# Patient Record
Sex: FEMALE | Race: WHITE | NOT HISPANIC OR LATINO | Employment: FULL TIME | URBAN - METROPOLITAN AREA
[De-identification: names, ages, dates, MRNs, and addresses within clinical notes are randomized per-mention and may not be internally consistent; named-entity substitution may affect disease eponyms.]

---

## 2017-01-19 ENCOUNTER — OFFICE VISIT - HEALTHEAST (OUTPATIENT)
Dept: FAMILY MEDICINE | Facility: CLINIC | Age: 32
End: 2017-01-19

## 2017-01-19 DIAGNOSIS — R07.81 RIB PAIN ON RIGHT SIDE: ICD-10-CM

## 2017-01-19 DIAGNOSIS — J32.9 SINUSITIS: ICD-10-CM

## 2017-01-19 RX ORDER — NORETHINDRONE ACETATE AND ETHINYL ESTRADIOL AND FERROUS FUMARATE 1.5-30(21)
KIT ORAL
Refills: 5 | Status: SHIPPED | COMMUNITY
Start: 2017-01-11

## 2017-01-19 RX ORDER — IBUPROFEN 200 MG
200 TABLET ORAL EVERY 6 HOURS PRN
Status: SHIPPED | COMMUNITY
Start: 2017-01-19

## 2019-01-29 ENCOUNTER — COMMUNICATION - HEALTHEAST (OUTPATIENT)
Dept: HEALTH INFORMATION MANAGEMENT | Facility: CLINIC | Age: 34
End: 2019-01-29

## 2021-05-24 ENCOUNTER — RECORDS - HEALTHEAST (OUTPATIENT)
Dept: ADMINISTRATIVE | Facility: CLINIC | Age: 36
End: 2021-05-24

## 2021-05-27 ENCOUNTER — RECORDS - HEALTHEAST (OUTPATIENT)
Dept: ADMINISTRATIVE | Facility: CLINIC | Age: 36
End: 2021-05-27

## 2021-05-30 VITALS — WEIGHT: 149.5 LBS

## 2021-06-01 ENCOUNTER — RECORDS - HEALTHEAST (OUTPATIENT)
Dept: ADMINISTRATIVE | Facility: CLINIC | Age: 36
End: 2021-06-01

## 2021-06-08 NOTE — PROGRESS NOTES
ASSESSMENT:   1. Sinusitis  amoxicillin-clavulanate (AUGMENTIN) 875-125 mg per tablet   2. Rib pain on right side             Patient is a otherwise healthy 31-year-old female presenting for evaluation of cough for the last 4 weeks.  Patient was likely within normal limits upon presentation.  She tolerated quite well.  She gives a history of infectious symptoms.  No clinical indications that cough represents PE, pleural effusion, PTX, or lung mass, or any other more emergent etiology of symptoms today.  I think her rib pain is most likely due to frequent coughing.  There is point tenderness along the right ribs along the mid axillary line.  This would not be consistent with PE.  I do not think her cough is due to pertussis.  Lungs were completely clear, and she is beyond recommended treatment window anyways, thus I do not think pertussis testing or empiric therapy is indicated today.  I think the most likely cause for her cough is postnasal drip from a sinusitis, which she affirms in her history. I will treat with Augmentin.    At the end of the encounter, I discussed results, diagnosis, medications. Discussed red flags for immediate return to clinic/ER, as well as indications for follow up if no improvement. Patient understood and agreed to plan. Patient was stable for discharge.    *I was masked during all patient interactions. Patient was also masked from time of registration.        PLAN:  1. Cough and nasal congestion  The lungs themselves sounded clear. The most likely cause for your cough is persistent post-nasal drip from a sinusitis. You look and sound quite congested, and since you have been ill for so long, it is worth treatment with an antibiotic for the sinuses today.    Your symptoms are most likely due to a sinus infection. I will send a prescription for Augmentin to your pharmacy. Please take as directed for 10 days. Take with food. Use a probiotic.    You could also try a neti-pot to help with  "congestion. You can try Mucinex to help loosen the mucous. You can also use ibuprofen to help decrease inflammation.    If you develop fevers, trouble breathing, or any new, concerning symptoms, return immediately for re-evaluation. Otherwise, follow up with primary care if not getting better in 10 days.      2. Rib pain  This is most likelya strain of the intercostal muscles due to coughing.   The best treatment for this is NSAIDs. May try Naproxen (Aleve) every 12 hours for pain. May also try heat therapy, over the counter lidocaine patches, and gentle stretches.  Follow up with primary care doctor if no improvement in 1-2 weeks. If getting worse, coughing up blood, feeling short of breathing, or having chest pain, come back immediately.            SUBJECTIVE:   Arianna Garcia is a 31 y.o. female who presents today for evaluation of cough for the last 4 weeks. She has had persistent congestion and cough. The cough has become more \"painful.\" She started having pain in the right side of her ribs with coughing, breathing, and moving. She has not noticed any bruising. It is tender when she touches it.   The cough is mostly dry. In the morning it is productive of dark yellow mucous. She is also having dark yellow nasal drainage.   No fevers, shortness of breath, wheezing, chest pain, leg swelling or leg pain. No body aches. She denies undue fatigue. No appetite changes. No history of blood clots. She takes daily OCPs. No chance of pregnancy per patient. Other people at work are ill with colds as well. Her  had influenza this past weekend.  She did not get an influenza vaccine this year.    Past Medical History:  Otherwise healthy; no chronic medical conditions    Surgical History:  Reviewed; Non-contributory    Family History:  Reviewed; Non-contributory      Social History:    History   Smoking Status     Never Smoker   Smokeless Tobacco     Not on file     Smoking: none  Alcohol use: rarely  Other drug " use: rarely  Occupation:         Current Medications:  No current outpatient prescriptions on file prior to visit.     No current facility-administered medications on file prior to visit.        Allergies:   No Known Allergies    I personally reviewed patient's past medical, surgical, social, family history and allergies.    ROS:  Review of Systems  See HPI      OBJECTIVE:   Visit Vitals     /68     Pulse 77     Temp 98.6  F (37  C) (Oral)     Resp 14     Wt 149 lb 8 oz (67.8 kg)     SpO2 100%         General Appearance:  Alert, well-appearing female in NAD. Afebrile.   Integument: Warm, dry.  HEENT:  Head: Atraumatic, normocephalic. Face nontraumatic.  Eyes: Conjunctiva clear, Lids normal.  Ears:  TMs dull bilaterally. No canal erythema or edema.  Nose: nares patent. Mild erythema of nasal mucosa. No rhinorrhea. No sinus tenderness.  Oropharynx: No trismus. ++ posterior pharyngeal erythema without exudate or petechiae. No tonsillar hypertrophy. Uvula midline. Moist mucus membranes.  Neck: Supple, no lymphadenopathy. No meningismus.  Respiratory: No distress. Lungs clear to ausculation bilaterally. No crackles, wheezes, rhonchi or stridor.  Cardiovascular: Regular rate and rhythm, no murmur, rub or gallop. No obvious chest wall deformities.  Musculoskeletal: + point tenderness of right ribs, mid-thoracic region, along mid-axillary line. No tenderness with lateral-medial chest compression.  Neurologic: Alert and orientated appropriately. No focal deficits.

## 2021-06-18 NOTE — LETTER
Letter by Jessica Hebert at      Author: Jessica Hebert Service: -- Author Type: --    Filed:  Encounter Date: 2019 Status: (Other)       2019       Arianna Garcia  [address] [facility] [id number]    Dear Arianna Garcia:    We are pleased to provide you with secure, online access to medical information for you and your family. Per your request, we have expanded your account to allow access to the records of the following family members:              Lacey NOVKA Robeevelyn (privilege ends on 2030.)     How Do I Login?  1. In your Internet browser, go to [email or web address].  2. Click on Sign Up Now   3. Enter your [employer] Activation Code exactly as it appears below. This code will  14 days after it is generated. You will not need to use this code after you have completed the sign-up process. If you do not sign up before the expiration date, you must request a new code.     [employer] Activation Code: [email or web address]  Expires: 3/30/2019  1:56 PM    4. Enter in your date of birth and zip code.  5. Create a [employer] username. Think of one that is secure and easy to remember.  Your username must be between 6 and 20 characters.  6. Create a [employer] password. You can change your password at any time. Your password must be between 8 and 45 characters, contain at least two letters and one number, and contain both upper and lower case letters.  7. Choose a security question, enter your answer, and click Next. This can be used to access [employer] if you forget your password.   8. Enter a valid e-mail address to receive e-mail notifications when new information is available in [employer].  9. Click Sign In.        How Do I Access a Family Member's Account?  10. Select the account you want to access by clicking the [place] with the appropriate patient's name at the top of your screen.   11. You will see a disclaimer page letting you know that you will be viewing a family member's  record. Review the disclaimer and then click Accept Proxy Access Disclaimer to proceed.  12. Once you switch to viewing a family member's record, you can navigate to [employer] pages the same way you would for yourself. You can return to your own account by clicking the [place] at the top of the screen with your name on it.    13. To customize colors and names of the linked accounts, you can select Personalize from the Profile dropdown menu at the top of the screen, then click the Edit button to make changes.      Additional Information  If you have questions, you can e-mail [email or web address] or call [phone] to talk to our [facility] staff. Remember, [employer] is NOT to be used for urgent needs. For medical emergencies, dial 911.

## 2021-08-22 ENCOUNTER — HEALTH MAINTENANCE LETTER (OUTPATIENT)
Age: 36
End: 2021-08-22

## 2021-10-17 ENCOUNTER — HEALTH MAINTENANCE LETTER (OUTPATIENT)
Age: 36
End: 2021-10-17

## 2022-10-01 ENCOUNTER — HEALTH MAINTENANCE LETTER (OUTPATIENT)
Age: 37
End: 2022-10-01

## 2022-12-15 ENCOUNTER — HOSPITAL ENCOUNTER (OUTPATIENT)
Dept: ULTRASOUND IMAGING | Facility: HOSPITAL | Age: 37
Discharge: HOME | End: 2022-12-15
Attending: FAMILY MEDICINE | Admitting: FAMILY MEDICINE
Payer: COMMERCIAL

## 2022-12-15 DIAGNOSIS — M79.604 RIGHT LEG PAIN: ICD-10-CM

## 2022-12-15 PROCEDURE — 93971 EXTREMITY STUDY: CPT | Mod: RT

## 2023-10-21 ENCOUNTER — HEALTH MAINTENANCE LETTER (OUTPATIENT)
Age: 38
End: 2023-10-21

## 2024-03-09 ENCOUNTER — HEALTH MAINTENANCE LETTER (OUTPATIENT)
Age: 39
End: 2024-03-09

## 2024-12-08 ENCOUNTER — HEALTH MAINTENANCE LETTER (OUTPATIENT)
Age: 39
End: 2024-12-08